# Patient Record
Sex: MALE | Race: WHITE | Employment: FULL TIME | URBAN - METROPOLITAN AREA
[De-identification: names, ages, dates, MRNs, and addresses within clinical notes are randomized per-mention and may not be internally consistent; named-entity substitution may affect disease eponyms.]

---

## 2021-11-08 DIAGNOSIS — M67.52 PLICA OF KNEE, LEFT: Primary | ICD-10-CM

## 2021-11-08 RX ORDER — OXYCODONE AND ACETAMINOPHEN 5; 325 MG/1; MG/1
1 TABLET ORAL
Qty: 42 TABLET | Refills: 0 | Status: SHIPPED | OUTPATIENT
Start: 2021-11-09 | End: 2021-11-16

## 2021-11-08 RX ORDER — CEPHALEXIN 500 MG/1
500 CAPSULE ORAL 4 TIMES DAILY
Qty: 28 CAPSULE | Refills: 0 | Status: SHIPPED | OUTPATIENT
Start: 2021-11-09 | End: 2021-11-16

## 2021-11-15 ENCOUNTER — TELEPHONE (OUTPATIENT)
Dept: ORTHOPEDIC SURGERY | Age: 26
End: 2021-11-15

## 2021-11-15 NOTE — TELEPHONE ENCOUNTER
Patient phoned ref. To pain and inflammation. Called patient back and advised ibuprofen, ice, elevation. Advised patient could stop narcotic and take tylenol if tolerable. Patient expressed understanding.

## 2021-11-19 ENCOUNTER — OFFICE VISIT (OUTPATIENT)
Dept: ORTHOPEDIC SURGERY | Age: 26
End: 2021-11-19
Payer: COMMERCIAL

## 2021-11-19 VITALS — HEIGHT: 72 IN | WEIGHT: 120 LBS | BODY MASS INDEX: 16.25 KG/M2

## 2021-11-19 VITALS — WEIGHT: 120 LBS | BODY MASS INDEX: 16.25 KG/M2 | HEIGHT: 72 IN

## 2021-11-19 DIAGNOSIS — M67.52 PLICA OF KNEE, LEFT: Primary | ICD-10-CM

## 2021-11-19 PROBLEM — S83.242A ACUTE MEDIAL MENISCUS TEAR OF LEFT KNEE: Status: RESOLVED | Noted: 2021-11-19 | Resolved: 2021-11-19

## 2021-11-19 PROBLEM — S83.242A ACUTE MEDIAL MENISCUS TEAR OF LEFT KNEE: Status: ACTIVE | Noted: 2021-11-19

## 2021-11-19 PROBLEM — M25.562 LEFT KNEE PAIN: Status: ACTIVE | Noted: 2021-11-19

## 2021-11-19 PROCEDURE — 99024 POSTOP FOLLOW-UP VISIT: CPT | Performed by: ORTHOPAEDIC SURGERY

## 2021-11-19 RX ORDER — HYDROMORPHONE HYDROCHLORIDE 2 MG/1
2 TABLET ORAL
Qty: 42 TABLET | Refills: 0 | Status: SHIPPED | OUTPATIENT
Start: 2021-11-19 | End: 2021-11-26

## 2021-11-19 RX ORDER — DICLOFENAC SODIUM 75 MG/1
TABLET, DELAYED RELEASE ORAL
COMMUNITY
Start: 2021-08-27 | End: 2021-11-19

## 2021-11-19 NOTE — PROGRESS NOTES
SUBJECTIVE/OBJECTIVE:  Tali Mora (: 1995) is a 27 y.o. male, patient,here for evaluation of the left knee. He is approximately a week status post left knee arthroscopy with plica excision. He reports has had increased swelling. Denies any fevers or chills. Is been taking Percocet. He denies any reinjury. Physical Exam    Examination of the operative knee reveals that there is a small effusion. The incisions are healing well, without evidence of drainage, erythema or warmth. There is limited range of motion secondary to discomfort. Strength distally is 5/5. There is no calf tenderness and a negative Cassi´s sign. Sensation is intact to light touch distally and there is a brisk capillary refill. ASSESSMENT/PLAN:  Below is the assessment and plan developed based on review of pertinent history, physical exam, labs, studies, and medications. 1. Plica of knee, left  -     HYDROmorphone (Dilaudid) 2 mg tablet; Take 1 Tablet by mouth every four to six (4-6) hours as needed for Pain for up to 7 days. Max Daily Amount: 12 mg., Normal, Disp-42 Tablet, R-0      Return in about 1 week (around 2021). After discussing treatment options, we have decided to proceed with formal physical therapy as well as a home exercise program for rehabilitation of the knee. We went over the arthroscopic pictures and removed the stitches during today´s visit. We will continue with ice and elevation of the knee to decrease swelling and pain. We will continue to utilize early mobilization and mechanical prophylaxis to reduce the chances of a deep vein thrombosis. We will wean them off any narcotic medications and progress to anti-inflammatories and Tylenol as long as there are no contraindications to these medications. We also discussed the risk and benefits and common side effects of taking these medications at today´s visit.  We also had a discussion regarding not driving while on narcotic medications and while impaired from a surgical or medical condition. I will see them back in three weeks time to evaluate their progress. They will call us in the interim if they have any questions or concerns prior to their follow up visit. After long discussion and performing a sterile preparation I anesthetized the knee with 10 cc of lidocaine. I then aspirated 30 cc of hematogenous fluid. The patient tolerated procedure well. We will follow him up in 1 week to make sure his swelling is improved and we can start physical therapy. No Known Allergies    Current Outpatient Medications   Medication Sig    HYDROmorphone (Dilaudid) 2 mg tablet Take 1 Tablet by mouth every four to six (4-6) hours as needed for Pain for up to 7 days. Max Daily Amount: 12 mg. No current facility-administered medications for this visit. Past Medical History:   Diagnosis Date    Acute medial meniscus tear of left knee 11/19/2021    Left knee pain 68/39/4270    Plica of knee, left 56/48/1195       Past Surgical History:   Procedure Laterality Date    HX KNEE ARTHROSCOPY         History reviewed. No pertinent family history.     Social History     Socioeconomic History    Marital status:      Spouse name: Not on file    Number of children: Not on file    Years of education: Not on file    Highest education level: Not on file   Occupational History    Not on file   Tobacco Use    Smoking status: Never Smoker    Smokeless tobacco: Never Used   Vaping Use    Vaping Use: Never used   Substance and Sexual Activity    Alcohol use: Not Currently    Drug use: Never    Sexual activity: Not on file   Other Topics Concern    Not on file   Social History Narrative    Not on file     Social Determinants of Health     Financial Resource Strain:     Difficulty of Paying Living Expenses: Not on file   Food Insecurity:     Worried About Running Out of Food in the Last Year: Not on file    [address] in the Last Year: Not on file   Transportation Needs:     Lack of Transportation (Medical): Not on file    Lack of Transportation (Non-Medical): Not on file   Physical Activity:     Days of Exercise per Week: Not on file    Minutes of Exercise per Session: Not on file   Stress:     Feeling of Stress : Not on file   Social Connections:     Frequency of Communication with Friends and Family: Not on file    Frequency of Social Gatherings with Friends and Family: Not on file    Attends Zoroastrianism Services: Not on file    Active Member of [address] [place] [address] [employer] or Organizations: Not on file    Attends Club or Organization Meetings: Not on file    Marital Status: Not on file   Intimate Partner Violence:     Fear of Current or Ex-Partner: Not on file    Emotionally Abused: Not on file    Physically Abused: Not on file    Sexually Abused: Not on file   Housing Stability:     Unable to Pay for Housing in the Last Year: Not on file    Number of [place] in the Last Year: Not on file    Unstable Housing in the Last Year: Not on file       Review of Systems    No flowsheet data found. Vitals:  Ht 6' (1.829 m)   Wt 120 lb (54.4 kg)   BMI 16.27 kg/m²    Body mass index is 16.27 kg/m². An electronic signature was used to authenticate this note.   -- Inder Mccarthy MD

## 2021-11-24 ENCOUNTER — OFFICE VISIT (OUTPATIENT)
Dept: ORTHOPEDIC SURGERY | Age: 26
End: 2021-11-24
Payer: COMMERCIAL

## 2021-11-24 VITALS — WEIGHT: 120 LBS | BODY MASS INDEX: 16.25 KG/M2 | HEIGHT: 72 IN

## 2021-11-24 DIAGNOSIS — S80.02XA HEMATOMA OF LEFT KNEE REGION: Primary | ICD-10-CM

## 2021-11-24 PROCEDURE — 99024 POSTOP FOLLOW-UP VISIT: CPT | Performed by: ORTHOPAEDIC SURGERY

## 2021-11-24 RX ORDER — CEPHALEXIN 500 MG/1
500 CAPSULE ORAL 4 TIMES DAILY
Qty: 30 CAPSULE | Refills: 0 | Status: SHIPPED | OUTPATIENT
Start: 2021-11-24 | End: 2022-01-18

## 2021-11-24 RX ORDER — OXYCODONE AND ACETAMINOPHEN 5; 325 MG/1; MG/1
1 TABLET ORAL
Qty: 42 TABLET | Refills: 0 | Status: SHIPPED | OUTPATIENT
Start: 2021-11-24 | End: 2021-12-01

## 2021-11-24 NOTE — PROGRESS NOTES
We discussed the treatment options for the patient´s diagnosis, which included: living with the extremity as it is, organized exercises, medicines, injections and surgical options. Utilizing shared treatment decision-making; we also discussed the nature and purpose of the treatment options along with the expected risks and benefits. The patient has expressed a desire to proceed with surgery, and I think that is a reasonable option. I educated the patient regarding the inherent and unavoidable risks which include, but are not limited to: anesthesia, infection, damage to nerves and blood vessels, blood loss, blood clots, and even death were discussed at length. We also talked about the possibility of not being able to return to prior activities or employment, the need for future surgery, and complex regional pain syndrome. The patient expressed understanding of these risks and has elected to proceed with surgery. Ample time was given for questions, of which many were addressed. We have discussed the surgical procedure as well as the realistic expectations regarding the risks, outcome and post operative protocol. We will set this up when it is convenient for the patient. We have instructed them to contact us if there are any questions or concerns between now and their date of surgery. We will schedule him for right knee arthroscopy with hematoma evacuation at this time. We will also refill his pain medication. SUBJECTIVE/OBJECTIVE:  Roman Murrell (: 1995) is a 27 y.o. male, patient,here for evaluation of the left knee. He is approximately a week status post left knee arthroscopy with plica excision. He reports has had increased swelling. Denies any fevers or chills. Is been taking Percocet. He denies any reinjury. Physical Exam    Examination of the operative knee reveals that there is a small effusion. The incisions are healing well, without evidence of drainage, erythema or warmth. There is limited range of motion secondary to discomfort. Strength distally is 5/5. There is no calf tenderness and a negative Cassi´s sign. Sensation is intact to light touch distally and there is a brisk capillary refill. ASSESSMENT/PLAN:  Below is the assessment and plan developed based on review of pertinent history, physical exam, labs, studies, and medications. 1. Plica of knee, left  -     HYDROmorphone (Dilaudid) 2 mg tablet; Take 1 Tablet by mouth every four to six (4-6) hours as needed for Pain for up to 7 days. Max Daily Amount: 12 mg., Normal, Disp-42 Tablet, R-0      Return in about 1 week (around 11/26/2021). After discussing treatment options, we have decided to proceed with formal physical therapy as well as a home exercise program for rehabilitation of the knee. We went over the arthroscopic pictures and removed the stitches during today´s visit. We will continue with ice and elevation of the knee to decrease swelling and pain. We will continue to utilize early mobilization and mechanical prophylaxis to reduce the chances of a deep vein thrombosis. We will wean them off any narcotic medications and progress to anti-inflammatories and Tylenol as long as there are no contraindications to these medications. We also discussed the risk and benefits and common side effects of taking these medications at today´s visit. We also had a discussion regarding not driving while on narcotic medications and while impaired from a surgical or medical condition. I will see them back in three weeks time to evaluate their progress. They will call us in the interim if they have any questions or concerns prior to their follow up visit. After long discussion and performing a sterile preparation I anesthetized the knee with 10 cc of lidocaine. I then aspirated 30 cc of hematogenous fluid. The patient tolerated procedure well.   We will follow him up in 1 week to make sure his swelling is improved and we can start physical therapy. No Known Allergies    Current Outpatient Medications   Medication Sig    HYDROmorphone (Dilaudid) 2 mg tablet Take 1 Tablet by mouth every four to six (4-6) hours as needed for Pain for up to 7 days. Max Daily Amount: 12 mg. No current facility-administered medications for this visit. Past Medical History:   Diagnosis Date    Acute medial meniscus tear of left knee 11/19/2021    Left knee pain 52/70/5607    Plica of knee, left 32/59/5748       Past Surgical History:   Procedure Laterality Date    HX KNEE ARTHROSCOPY         History reviewed. No pertinent family history. Social History     Socioeconomic History    Marital status:      Spouse name: Not on file    Number of children: Not on file    Years of education: Not on file    Highest education level: Not on file   Occupational History    Not on file   Tobacco Use    Smoking status: Never Smoker    Smokeless tobacco: Never Used   Vaping Use    Vaping Use: Never used   Substance and Sexual Activity    Alcohol use: Not Currently    Drug use: Never    Sexual activity: Not on file   Other Topics Concern    Not on file   Social History Narrative    Not on file     Social Determinants of Health     Financial Resource Strain:     Difficulty of Paying Living Expenses: Not on file   Food Insecurity:     Worried About Running Out of Food in the Last Year: Not on file    Herve of Food in the Last Year: Not on file   Transportation Needs:     Lack of Transportation (Medical): Not on file    Lack of Transportation (Non-Medical):  Not on file   Physical Activity:     Days of Exercise per Week: Not on file    Minutes of Exercise per Session: Not on file   Stress:     Feeling of Stress : Not on file   Social Connections:     Frequency of Communication with Friends and Family: Not on file    Frequency of Social Gatherings with Friends and Family: Not on file    Attends Confucianism Services: Not on file   [facility] Active Member of Clubs or Organizations: Not on file    Attends Club or Organization Meetings: Not on file    Marital Status: Not on file   Intimate Partner Violence:     Fear of Current or Ex-Partner: Not on file    Emotionally Abused: Not on file    Physically Abused: Not on file    Sexually Abused: Not on file   Housing Stability:     Unable to Pay for Housing in the Last Year: Not on file    Number of [place] in the Last Year: Not on file    Unstable Housing in the Last Year: Not on file       Review of Systems    No flowsheet data found. Vitals:  Ht 6' (1.829 m)   Wt 120 lb (54.4 kg)   BMI 16.27 kg/m²    Body mass index is 16.27 kg/m². An electronic signature was used to authenticate this note.   -- Gustavo Salazar MD

## 2021-11-29 ENCOUNTER — DOCUMENTATION ONLY (OUTPATIENT)
Dept: ORTHOPEDIC SURGERY | Age: 26
End: 2021-11-29

## 2021-11-29 NOTE — PROGRESS NOTES
Received notification from pharmacy that a prior authorization was needed for patients prescription for oxycodone. This was completed and submitted through [employer].      Savanna Cunha, III

## 2021-12-08 ENCOUNTER — OFFICE VISIT (OUTPATIENT)
Dept: ORTHOPEDIC SURGERY | Age: 26
End: 2021-12-08
Payer: COMMERCIAL

## 2021-12-08 VITALS — WEIGHT: 120 LBS | BODY MASS INDEX: 16.25 KG/M2 | HEIGHT: 72 IN

## 2021-12-08 DIAGNOSIS — S80.02XA HEMATOMA OF LEFT KNEE REGION: Primary | ICD-10-CM

## 2021-12-08 PROCEDURE — 99024 POSTOP FOLLOW-UP VISIT: CPT | Performed by: ORTHOPAEDIC SURGERY

## 2021-12-08 NOTE — PROGRESS NOTES
ASSESSMENT/PLAN:  Below is the assessment and plan developed based on review of pertinent history, physical exam, labs, studies, and medications. 1. Hematoma of left knee region  After discussing risk and benefits, I anesthetized the skin and aspirated approximately 40 cc of hematoma from the knee. He tolerated the procedure well. A Band-Aid was placed. Given that the patient's symptoms are increasing in frequency and duration we have decided to prescribe physical therapy. We talked about the fact that the goal of physical therapy is for the therapsit to assist in developing a program to help return the patient to full strength, function and mobility and decrease pain. We also discussed that the therpasit may combine several techniques to help decrease pain. These include but are not limited to stretching,  balance exercises, strength training, massage, cold and heat therapy, and electrical stimulation. Althoough, physical therpay is generally safe, we went over the potential risks to include the worsening of pre-existing conditions, continued pain and no improvement in flexibility, mobility and strength. We will have the patient follow up after physical therapy to closely monitor their progress. We talked about following up sooner if therapy is not progressing on a weekly basis. No follow-ups on file. After discussing treatment options, we have decided to proceed with formal physical therapy as well as a home exercise program for rehabilitation of the knee. We went over the arthroscopic pictures and removed the stitches during today´s visit. We will continue with ice and elevation of the knee to decrease swelling and pain. We will continue to utilize early mobilization and mechanical prophylaxis to reduce the chances of a deep vein thrombosis.  We will wean them off any narcotic medications and progress to anti-inflammatories and Tylenol as long as there are no contraindications to these medications. We also discussed the risk and benefits and common side effects of taking these medications at today´s visit. We also had a discussion regarding not driving while on narcotic medications and while impaired from a surgical or medical condition. I will see them back in three weeks time to evaluate their progress. They will call us in the interim if they have any questions or concerns prior to their follow up visit. SUBJECTIVE/OBJECTIVE:  Reed Coy (: 1995) is a 27 y.o. male, patient,here for evaluation of the No chief complaint on file. .  The patient returns today for follow-up of his left knee. He is approximately 1 week status post left knee arthroscopy with hematoma evacuation. Has been icing elevating. He denies any numbness or tingling erythema or warmth. He is continued ice and elevate as well as take some anti-inflammatory medications. Physical Exam    Examination of the operative knee reveals that there is a small effusion. The incisions are healing well, without evidence of drainage, erythema or warmth. There is limited range of motion secondary to discomfort. Strength distally is 5/5. There is no calf tenderness and a negative Cassi´s sign. Sensation is intact to light touch distally and there is a brisk capillary refill. No Known Allergies    Current Outpatient Medications   Medication Sig    cephALEXin (Keflex) 500 mg capsule Take 1 Capsule by mouth four (4) times daily. No current facility-administered medications for this visit. Past Medical History:   Diagnosis Date    Acute medial meniscus tear of left knee 2021    Left knee pain     Plica of knee, left        Past Surgical History:   Procedure Laterality Date    HX KNEE ARTHROSCOPY         No family history on file.     Social History     Socioeconomic History    Marital status:      Spouse name: Not on file    Number of children: Not on file    Years of education: Not on file    Highest education level: Not on file   Occupational History    Not on file   Tobacco Use    Smoking status: Never Smoker    Smokeless tobacco: Never Used   Vaping Use    Vaping Use: Never used   Substance and Sexual Activity    Alcohol use: Not Currently    Drug use: Never    Sexual activity: Not on file   Other Topics Concern    Not on file   Social History Narrative    Not on file     Social Determinants of Health     Financial Resource Strain:     Difficulty of Paying Living Expenses: Not on file   Food Insecurity:     Worried About Running Out of Food in the Last Year: Not on file    Herve of Food in the Last Year: Not on file   Transportation Needs:     Lack of Transportation (Medical): Not on file    Lack of Transportation (Non-Medical): Not on file   Physical Activity:     Days of Exercise per Week: Not on file    Minutes of Exercise per Session: Not on file   Stress:     Feeling of Stress : Not on file   Social Connections:     Frequency of Communication with Friends and Family: Not on file    Frequency of Social Gatherings with Friends and Family: Not on file    Attends [employer] Services: Not on file    Active Member of [address] [place] [address] or Organizations: Not on file    Attends Club or Organization Meetings: Not on file    Marital Status: Not on file   Intimate Partner Violence:     Fear of Current or Ex-Partner: Not on file    Emotionally Abused: Not on file    Physically Abused: Not on file    Sexually Abused: Not on file   Housing Stability:     Unable to Pay for Housing in the Last Year: Not on file    Number of [place] in the Last Year: Not on file    Unstable Housing in the Last Year: Not on file       Review of Systems    No flowsheet data found. Vitals: There were no vitals taken for this visit. There is no height or weight on file to calculate BMI. An electronic signature was used to authenticate this note.   -- Shanelle Zuleta MD

## 2021-12-27 PROBLEM — S80.02XA HEMATOMA OF LEFT KNEE REGION: Status: ACTIVE | Noted: 2021-12-27

## 2021-12-27 NOTE — PROGRESS NOTES
ASSESSMENT/PLAN:  Below is the assessment and plan developed based on review of pertinent history, physical exam, labs, studies, and medications. 1. Hematoma of left knee region      Return in about 3 weeks (around 2022). Given the fact these continue make progress with physical therapy will continue therapy 3 times a week. I urged him to work more diligently in home to regain his range of motion as well as some soft tissue massage. We talked about the possibly of manipulation under anesthesia if he continues to struggle with his motion. SUBJECTIVE/OBJECTIVE:  Felecia Benavidez (: 1995) is a 27 y.o. male, patient,here for evaluation of the Knee Pain (left knee) and Surgical Follow-up  . Patient returns today for follow-up of his left knee. He is proximately a month status post irrigation and debridement from left knee hematoma. He has been dissipating in physical therapy. He reports he still has some weakness and swelling but is making progress. Physical Exam    Examination of the operative knee reveals that there is a small effusion. The incisions are well healed, without evidence of drainage, erythema or warmth. Range of motion and strength are progressing appropriately at this stage of rehabilitation. Strength distally is 5/5. There is no calf tenderness and a negative Cassi´s sign. Sensation is intact to light touch distally and there is a brisk capillary refill. No Known Allergies    Current Outpatient Medications   Medication Sig    cephALEXin (Keflex) 500 mg capsule Take 1 Capsule by mouth four (4) times daily. No current facility-administered medications for this visit. Past Medical History:   Diagnosis Date    Acute medial meniscus tear of left knee 2021    Left knee pain     Plica of knee, left        Past Surgical History:   Procedure Laterality Date    HX KNEE ARTHROSCOPY         History reviewed.  No pertinent family history. Social History     Socioeconomic History    Marital status:      Spouse name: Not on file    Number of children: Not on file    Years of education: Not on file    Highest education level: Not on file   Occupational History    Not on file   Tobacco Use    Smoking status: Never Smoker    Smokeless tobacco: Never Used   Vaping Use    Vaping Use: Never used   Substance and Sexual Activity    Alcohol use: Not Currently    Drug use: Never    Sexual activity: Not on file   Other Topics Concern    Not on file   Social History Narrative    Not on file     Social Determinants of Health     Financial Resource Strain:     Difficulty of Paying Living Expenses: Not on file   Food Insecurity:     Worried About Running Out of Food in the Last Year: Not on file    Herve of Food in the Last Year: Not on file   Transportation Needs:     Lack of Transportation (Medical): Not on file    Lack of Transportation (Non-Medical):  Not on file   Physical Activity:     Days of Exercise per Week: Not on file    Minutes of Exercise per Session: Not on file   Stress:     Feeling of Stress : Not on file   Social Connections:     Frequency of Communication with Friends and Family: Not on file    Frequency of Social Gatherings with Friends and Family: Not on file    Attends Synagogue Services: Not on file    Active Member of [address] [place] [address] [employer] or Organizations: Not on file    Attends Club or Organization Meetings: Not on file    Marital Status: Not on file   Intimate Partner Violence:     Fear of Current or Ex-Partner: Not on file    Emotionally Abused: Not on file    Physically Abused: Not on file    Sexually Abused: Not on file   Housing Stability:     Unable to Pay for Housing in the Last Year: Not on file    Number of [place] in the Last Year: Not on file    Unstable Housing in the Last Year: Not on file       Review of Systems    Pain Assessment  12/8/2021   Location of Pain Knee   Location Modifiers Left   Severity of Pain 4       Vitals:  Ht 6' (1.829 m)   Wt 120 lb (54.4 kg)   BMI 16.27 kg/m²    Body mass index is 16.27 kg/m². An electronic signature was used to authenticate this note.   -- Gustavo Salazar MD

## 2021-12-28 ENCOUNTER — OFFICE VISIT (OUTPATIENT)
Dept: ORTHOPEDIC SURGERY | Age: 26
End: 2021-12-28
Payer: COMMERCIAL

## 2021-12-28 VITALS — BODY MASS INDEX: 16.25 KG/M2 | WEIGHT: 120 LBS | HEIGHT: 72 IN

## 2021-12-28 DIAGNOSIS — S80.02XA HEMATOMA OF LEFT KNEE REGION: Primary | ICD-10-CM

## 2021-12-28 PROCEDURE — 99024 POSTOP FOLLOW-UP VISIT: CPT | Performed by: ORTHOPAEDIC SURGERY

## 2022-01-18 ENCOUNTER — OFFICE VISIT (OUTPATIENT)
Dept: ORTHOPEDIC SURGERY | Age: 27
End: 2022-01-18
Payer: COMMERCIAL

## 2022-01-18 VITALS — HEIGHT: 72 IN | WEIGHT: 120 LBS | BODY MASS INDEX: 16.25 KG/M2

## 2022-01-18 DIAGNOSIS — S80.02XA HEMATOMA OF LEFT KNEE REGION: Primary | ICD-10-CM

## 2022-01-18 DIAGNOSIS — M24.662 ARTHROFIBROSIS OF KNEE JOINT, LEFT: ICD-10-CM

## 2022-01-18 DIAGNOSIS — M24.562 FLEXION CONTRACTURE OF KNEE, LEFT: ICD-10-CM

## 2022-01-18 PROCEDURE — 99024 POSTOP FOLLOW-UP VISIT: CPT | Performed by: ORTHOPAEDIC SURGERY

## 2022-01-18 NOTE — PROGRESS NOTES
ASSESSMENT/PLAN:  Below is the assessment and plan developed based on review of pertinent history, physical exam, labs, studies, and medications. 1. Hematoma of left knee region      We discussed the treatment options for the patient´s diagnosis, which included: living with the extremity as it is, organized exercises, medicines, injections, and surgical options. Utilizing shared treatment decision-making; we also discussed the nature and purpose of the treatment options along with the expected risks and benefits. The patient has expressed a desire to proceed with surgery, and I think that is a reasonable option. I educated the patient regarding the inherent and unavoidable risks which include, but are not limited to anesthesia, infection, damage to nerves and blood vessels, blood loss, blood clots, and even death were discussed at length. We also talked about the possibility of not being able to return to prior activities or employment, the need for future surgery, and complex regional pain syndrome. The patient expressed understanding of these risks and has elected to proceed with surgery. Ample time was given for questions, of which many were addressed. We have discussed the surgical procedure as well as the realistic expectations regarding the risks, outcome and post-operative protocol. We will set this up when it is convenient for the patient. We have instructed them to contact us if there are any questions or concerns between now and their date of surgery. We talked about procedure as well as postoperative protocol in detail. He is can look at his calendar and call us to schedule left knee manipulation managed anesthesia with cortisone injection followed by daily physical therapy. SUBJECTIVE/OBJECTIVE:  Eric Ricks (: 1995) is a 27 y.o. male, patient,here for evaluation of the Knee Pain (left knee) and Surgical Follow-up  . Patient returns today for follow-up of his left knee.   He is proximately a month status post irrigation and debridement from left knee hematoma. He has been dissipating in physical therapy. He reports he still has some weakness and swelling but is making progress. He has been making slow progress. Physical Exam    Examination of the operative knee reveals that there is a small effusion. The incisions are well healed, without evidence of drainage, erythema or warmth. Range of motion and strength are progressing appropriately at this stage of rehabilitation. Strength distally is 5/5. There is no calf tenderness and a negative Cassi´s sign. Sensation is intact to light touch distally and there is a brisk capillary refill. He still lacks approximately 5 degrees of extension as well as 40 degrees of flexion. He has some quadricep weakness. No Known Allergies    Current Outpatient Medications   Medication Sig    cephALEXin (Keflex) 500 mg capsule Take 1 Capsule by mouth four (4) times daily. No current facility-administered medications for this visit. Past Medical History:   Diagnosis Date    Acute medial meniscus tear of left knee 11/19/2021    Left knee pain 41/64/6461    Plica of knee, left 03/34/4873       Past Surgical History:   Procedure Laterality Date    HX KNEE ARTHROSCOPY         History reviewed. No pertinent family history.     Social History     Socioeconomic History    Marital status:      Spouse name: Not on file    Number of children: Not on file    Years of education: Not on file    Highest education level: Not on file   Occupational History    Not on file   Tobacco Use    Smoking status: Never Smoker    Smokeless tobacco: Never Used   Vaping Use    Vaping Use: Never used   Substance and Sexual Activity    Alcohol use: Not Currently    Drug use: Never    Sexual activity: Not on file   Other Topics Concern    Not on file   Social History Narrative    Not on file     Social Determinants of Health     Financial Resource Strain:     Difficulty of Paying Living Expenses: Not on file   Food Insecurity:     Worried About Running Out of Food in the Last Year: Not on file    Herve of Food in the Last Year: Not on file   Transportation Needs:     Lack of Transportation (Medical): Not on file    Lack of Transportation (Non-Medical): Not on file   Physical Activity:     Days of Exercise per Week: Not on file    Minutes of Exercise per Session: Not on file   Stress:     Feeling of Stress : Not on file   Social Connections:     Frequency of Communication with Friends and Family: Not on file    Frequency of Social Gatherings with Friends and Family: Not on file    Attends Latter-day Services: Not on file    Active Member of [address] [place] [address] [employer] or Organizations: Not on file    Attends Club or Organization Meetings: Not on file    Marital Status: Not on file   Intimate Partner Violence:     Fear of Current or Ex-Partner: Not on file    Emotionally Abused: Not on file    Physically Abused: Not on file    Sexually Abused: Not on file   Housing Stability:     Unable to Pay for Housing in the Last Year: Not on file    Number of [place] in the Last Year: Not on file    Unstable Housing in the Last Year: Not on file       Review of Systems    Pain Assessment  12/8/2021   Location of Pain Knee   Location Modifiers Left   Severity of Pain 4       Vitals:  Ht 6' (1.829 m)   Wt 120 lb (54.4 kg)   BMI 16.27 kg/m²    Body mass index is 16.27 kg/m². An electronic signature was used to authenticate this note.   -- Amalia Hernandez MD

## 2022-01-20 DIAGNOSIS — M24.562 FLEXION CONTRACTURE OF KNEE, LEFT: Primary | ICD-10-CM

## 2022-01-21 RX ORDER — OXYCODONE AND ACETAMINOPHEN 5; 325 MG/1; MG/1
1 TABLET ORAL
Qty: 42 TABLET | Refills: 0 | Status: SHIPPED | OUTPATIENT
Start: 2022-01-21 | End: 2022-01-28

## 2022-01-25 ENCOUNTER — TELEPHONE (OUTPATIENT)
Dept: ORTHOPEDIC SURGERY | Age: 27
End: 2022-01-25

## 2022-01-25 DIAGNOSIS — M24.562 FLEXION CONTRACTURE OF KNEE, LEFT: Primary | ICD-10-CM

## 2022-01-25 RX ORDER — ONDANSETRON 4 MG/1
4 TABLET, FILM COATED ORAL
Qty: 30 TABLET | Refills: 0 | Status: SHIPPED | OUTPATIENT
Start: 2022-01-25 | End: 2022-02-23

## 2022-01-25 NOTE — TELEPHONE ENCOUNTER
Patient phones office stating that he has nausea and vomitting after taking his pain med. I advised patient that he could stop the narcotic pain med and take ibuprofen and tylenol alternating, patient understood. I will send in zofran for if he has to take the narcotic pain med.

## 2022-01-30 NOTE — PROGRESS NOTES
ASSESSMENT/PLAN:  Below is the assessment and plan developed based on review of pertinent history, physical exam, labs, studies, and medications. 1. Arthrofibrosis of knee joint, left      Return in about 3 weeks (around 2022). He continues to make progress with physical therapy after the manipulation. Is pain levels are well controlled. We have encouraged him to continue to work diligently on his range of motion with extension and also flexion. He is going to therapy on a daily basis and doing the home exercises pretty much every hour. We will see him back in a few weeks time for reevaluation. SUBJECTIVE/OBJECTIVE:  Yamil Gaines (: 1995) is a 27 y.o. male, patient,here for evaluation of the Knee Pain (left knee) and Surgical Follow-up  . The patient returns today for follow-up of his left knee. He underwent left knee manipulation under anesthesia with cortisone injection on 2022. He has been attending physical therapy daily. He has been working on range of motion exercises. He denies any numbness tingling erythema or warmth. Physical Exam    General: Well-dressed well-nourished male no acute distress, alert and oriented x3  Left knee: Incisions healed nicely over the anterior portion of the knee. Slight effusion on exam.  Motion from 2 - 125 degrees. No swelling or tenderness in the calf. Toes up and downgoing. Sensation intact to light touch over the knee and distally in the leg and foot. No Known Allergies    Current Outpatient Medications   Medication Sig    ondansetron hcl (Zofran) 4 mg tablet Take 1 Tablet by mouth every eight (8) hours as needed for Nausea or Vomiting. No current facility-administered medications for this visit.        Past Medical History:   Diagnosis Date    Acute medial meniscus tear of left knee 2021    Left knee pain     Plica of knee, left        Past Surgical History:   Procedure Laterality Date  HX KNEE ARTHROSCOPY         History reviewed. No pertinent family history. Social History     Socioeconomic History    Marital status:      Spouse name: Not on file    Number of children: Not on file    Years of education: Not on file    Highest education level: Not on file   Occupational History    Not on file   Tobacco Use    Smoking status: Never Smoker    Smokeless tobacco: Never Used   Vaping Use    Vaping Use: Never used   Substance and Sexual Activity    Alcohol use: Not Currently    Drug use: Never    Sexual activity: Not on file   Other Topics Concern    Not on file   Social History Narrative    Not on file     Social Determinants of Health     Financial Resource Strain:     Difficulty of Paying Living Expenses: Not on file   Food Insecurity:     Worried About Running Out of Food in the Last Year: Not on file    Herve of Food in the Last Year: Not on file   Transportation Needs:     Lack of Transportation (Medical): Not on file    Lack of Transportation (Non-Medical):  Not on file   Physical Activity:     Days of Exercise per Week: Not on file    Minutes of Exercise per Session: Not on file   Stress:     Feeling of Stress : Not on file   Social Connections:     Frequency of Communication with Friends and Family: Not on file    Frequency of Social Gatherings with Friends and Family: Not on file    Attends Hoahaoism Services: Not on file    Active Member of [address] [place] [address] [employer] or Organizations: Not on file    Attends Club or Organization Meetings: Not on file    Marital Status: Not on file   Intimate Partner Violence:     Fear of Current or Ex-Partner: Not on file    Emotionally Abused: Not on file    Physically Abused: Not on file    Sexually Abused: Not on file   Housing Stability:     Unable to Pay for Housing in the Last Year: Not on file    Number of [place] in the Last Year: Not on file    Unstable Housing in the Last Year: Not on file       Review of Systems    Pain Assessment  12/8/2021   Location of Pain Knee   Location Modifiers Left   Severity of Pain 4       Vitals:  Ht 6' (1.829 m)   Wt 120 lb (54.4 kg)   BMI 16.27 kg/m²    Body mass index is 16.27 kg/m². An electronic signature was used to authenticate this note.   -- Aneta Jimenez MD

## 2022-01-31 ENCOUNTER — OFFICE VISIT (OUTPATIENT)
Dept: ORTHOPEDIC SURGERY | Age: 27
End: 2022-01-31
Payer: COMMERCIAL

## 2022-01-31 VITALS — BODY MASS INDEX: 16.25 KG/M2 | HEIGHT: 72 IN | WEIGHT: 120 LBS

## 2022-01-31 DIAGNOSIS — M24.662 ARTHROFIBROSIS OF KNEE JOINT, LEFT: Primary | ICD-10-CM

## 2022-01-31 PROCEDURE — 99024 POSTOP FOLLOW-UP VISIT: CPT | Performed by: ORTHOPAEDIC SURGERY

## 2022-01-31 NOTE — LETTER
NOTIFICATION RETURN TO WORK / SCHOOL    1/31/2022 9:53 AM    Mr. Duke Zamorano  [address] [facility] [address]      To Whom It May Concern:    Duke Zamorano is currently under the care of Adonay Pan. He will return to work/school on: January 31 of 2022. He is required to do 10 minutes of stretching for every hour that he is working. If there are questions or concerns please have the patient contact our office.         Sincerely,      Florencia Rodriguez MD

## 2022-02-22 NOTE — PROGRESS NOTES
ASSESSMENT/PLAN:  Below is the assessment and plan developed based on review of pertinent history, physical exam, labs, studies, and medications. 1. Arthrofibrosis of knee joint, left      We will continue physical therapy primarily focusing on his extension. I will see him back in 1 month's time to evaluate his progress. SUBJECTIVE/OBJECTIVE:  Fatou Jacques (: 1995) is a 27 y.o. male, patient,here for evaluation of the Knee Pain (left knee) and Surgical Follow-up  . The patient returns today for follow-up of his left knee. He underwent left knee manipulation under anesthesia with cortisone injection on 2022. He has been attending physical therapy daily. He has been working on range of motion exercises. He denies any numbness tingling erythema or warmth. He has gained almost full range of motion at this time and he feels good about riding a bike. Physical Exam    General: Well-dressed well-nourished male no acute distress, alert and oriented x3  Left knee: Incisions healed nicely over the anterior portion of the knee. Slight effusion on exam.  Motion from 2 - 125 degrees. No swelling or tenderness in the calf. Toes up and downgoing. Sensation intact to light touch over the knee and distally in the leg and foot. No Known Allergies    Current Outpatient Medications   Medication Sig    ondansetron hcl (Zofran) 4 mg tablet Take 1 Tablet by mouth every eight (8) hours as needed for Nausea or Vomiting. No current facility-administered medications for this visit. Past Medical History:   Diagnosis Date    Acute medial meniscus tear of left knee 2021    Left knee pain     Plica of knee, left        Past Surgical History:   Procedure Laterality Date    HX KNEE ARTHROSCOPY         History reviewed. No pertinent family history.     Social History     Socioeconomic History    Marital status:      Spouse name: Not on file    Number of children: Not on file    Years of education: Not on file    Highest education level: Not on file   Occupational History    Not on file   Tobacco Use    Smoking status: Never Smoker    Smokeless tobacco: Never Used   Vaping Use    Vaping Use: Never used   Substance and Sexual Activity    Alcohol use: Not Currently    Drug use: Never    Sexual activity: Not on file   Other Topics Concern    Not on file   Social History Narrative    Not on file     Social Determinants of Health     Financial Resource Strain:     Difficulty of Paying Living Expenses: Not on file   Food Insecurity:     Worried About Running Out of Food in the Last Year: Not on file    Herve of Food in the Last Year: Not on file   Transportation Needs:     Lack of Transportation (Medical): Not on file    Lack of Transportation (Non-Medical):  Not on file   Physical Activity:     Days of Exercise per Week: Not on file    Minutes of Exercise per Session: Not on file   Stress:     Feeling of Stress : Not on file   Social Connections:     Frequency of Communication with Friends and Family: Not on file    Frequency of Social Gatherings with Friends and Family: Not on file    Attends Mormonism Services: Not on file    Active Member of [address] [place] [address] [employer] or Organizations: Not on file    Attends Club or Organization Meetings: Not on file    Marital Status: Not on file   Intimate Partner Violence:     Fear of Current or Ex-Partner: Not on file    Emotionally Abused: Not on file    Physically Abused: Not on file    Sexually Abused: Not on file   Housing Stability:     Unable to Pay for Housing in the Last Year: Not on file    Number of [place] in the Last Year: Not on file    Unstable Housing in the Last Year: Not on file       Review of Systems    Pain Assessment  12/8/2021   Location of Pain Knee   Location Modifiers Left   Severity of Pain 4       Vitals:  Ht 6' (1.829 m)   Wt 120 lb (54.4 kg)   BMI 16.27 kg/m²    Body mass index is 16.27 kg/m². An electronic signature was used to authenticate this note.   -- Alyx Armendariz MD

## 2022-02-23 ENCOUNTER — OFFICE VISIT (OUTPATIENT)
Dept: ORTHOPEDIC SURGERY | Age: 27
End: 2022-02-23
Payer: COMMERCIAL

## 2022-02-23 VITALS — HEIGHT: 72 IN | WEIGHT: 120 LBS | BODY MASS INDEX: 16.25 KG/M2

## 2022-02-23 DIAGNOSIS — M24.662 ARTHROFIBROSIS OF KNEE JOINT, LEFT: Primary | ICD-10-CM

## 2022-02-23 PROCEDURE — 99024 POSTOP FOLLOW-UP VISIT: CPT | Performed by: ORTHOPAEDIC SURGERY

## 2022-03-24 ENCOUNTER — DOCUMENTATION ONLY (OUTPATIENT)
Dept: ORTHOPEDIC SURGERY | Age: 27
End: 2022-03-24

## 2022-03-30 ENCOUNTER — OFFICE VISIT (OUTPATIENT)
Dept: ORTHOPEDIC SURGERY | Age: 27
End: 2022-03-30
Payer: COMMERCIAL

## 2022-03-30 VITALS — HEIGHT: 72 IN | WEIGHT: 120 LBS | BODY MASS INDEX: 16.25 KG/M2

## 2022-03-30 DIAGNOSIS — M24.662 ARTHROFIBROSIS OF KNEE JOINT, LEFT: Primary | ICD-10-CM

## 2022-03-30 PROCEDURE — 99213 OFFICE O/P EST LOW 20 MIN: CPT | Performed by: ORTHOPAEDIC SURGERY

## 2022-03-30 NOTE — PROGRESS NOTES
ASSESSMENT/PLAN:  Below is the assessment and plan developed based on review of pertinent history, physical exam, labs, studies, and medications. 1. Arthrofibrosis of knee joint, left      We will continue physical therapy primarily focusing on his extension. I will see him back in 1 month's time to evaluate his progress. SUBJECTIVE/OBJECTIVE:  John Chan (: 1995) is a 27 y.o. male, patient,here for evaluation of the Knee Pain (left knee) and Surgical Follow-up  . The patient returns today for follow-up of his left knee. He underwent left knee manipulation under anesthesia with cortisone injection on 2022. He has been attending physical therapy daily. He has been working on range of motion exercises. He denies any numbness tingling erythema or warmth. He has gained almost full range of motion at this time and he feels good about riding a bike. Physical Exam    General: Well-dressed well-nourished male no acute distress, alert and oriented x3  Left knee: Incisions healed nicely over the anterior portion of the knee. Slight effusion on exam.  Motion from 2 - 125 degrees. No swelling or tenderness in the calf. Toes up and downgoing. Sensation intact to light touch over the knee and distally in the leg and foot. No Known Allergies    Current Outpatient Medications   Medication Sig    ondansetron hcl (Zofran) 4 mg tablet Take 1 Tablet by mouth every eight (8) hours as needed for Nausea or Vomiting. No current facility-administered medications for this visit. Past Medical History:   Diagnosis Date    Acute medial meniscus tear of left knee 2021    Left knee pain     Plica of knee, left        Past Surgical History:   Procedure Laterality Date    HX KNEE ARTHROSCOPY         History reviewed. No pertinent family history.     Social History     Socioeconomic History    Marital status:      Spouse name: Not on file    Number of children: Not on file    Years of education: Not on file    Highest education level: Not on file   Occupational History    Not on file   Tobacco Use    Smoking status: Never Smoker    Smokeless tobacco: Never Used   Vaping Use    Vaping Use: Never used   Substance and Sexual Activity    Alcohol use: Not Currently    Drug use: Never    Sexual activity: Not on file   Other Topics Concern    Not on file   Social History Narrative    Not on file     Social Determinants of Health     Financial Resource Strain:     Difficulty of Paying Living Expenses: Not on file   Food Insecurity:     Worried About Running Out of Food in the Last Year: Not on file    Herve of Food in the Last Year: Not on file   Transportation Needs:     Lack of Transportation (Medical): Not on file    Lack of Transportation (Non-Medical):  Not on file   Physical Activity:     Days of Exercise per Week: Not on file    Minutes of Exercise per Session: Not on file   Stress:     Feeling of Stress : Not on file   Social Connections:     Frequency of Communication with Friends and Family: Not on file    Frequency of Social Gatherings with Friends and Family: Not on file    Attends Roman Catholic Services: Not on file    Active Member of [address] [place] [address] [employer] or Organizations: Not on file    Attends Club or Organization Meetings: Not on file    Marital Status: Not on file   Intimate Partner Violence:     Fear of Current or Ex-Partner: Not on file    Emotionally Abused: Not on file    Physically Abused: Not on file    Sexually Abused: Not on file   Housing Stability:     Unable to Pay for Housing in the Last Year: Not on file    Number of [place] in the Last Year: Not on file    Unstable Housing in the Last Year: Not on file       Review of Systems    Pain Assessment  12/8/2021   Location of Pain Knee   Location Modifiers Left   Severity of Pain 4       Vitals:  Ht 6' (1.829 m)   Wt 120 lb (54.4 kg)   BMI 16.27 kg/m²    Body mass index is 16.27 kg/m². An electronic signature was used to authenticate this note.   -- Leeroy Closs, MD

## 2022-05-25 ENCOUNTER — OFFICE VISIT (OUTPATIENT)
Dept: ORTHOPEDIC SURGERY | Age: 27
End: 2022-05-25
Payer: COMMERCIAL

## 2022-05-25 VITALS — BODY MASS INDEX: 16.25 KG/M2 | HEIGHT: 72 IN | WEIGHT: 120 LBS

## 2022-05-25 DIAGNOSIS — M24.662 ARTHROFIBROSIS OF KNEE JOINT, LEFT: Primary | ICD-10-CM

## 2022-05-25 PROCEDURE — 99213 OFFICE O/P EST LOW 20 MIN: CPT | Performed by: PHYSICIAN ASSISTANT

## 2022-05-25 NOTE — PROGRESS NOTES
ASSESSMENT/PLAN:  Below is the assessment and plan developed based on review of pertinent history, physical exam, labs, studies, and medications. 1. Arthrofibrosis of knee joint, left    28-year-old male following up after his left knee surgery in January. He has regained full range of motion at this time and is continuing to gain his strength back. He does still have significant quad atrophy but he is back doing most every day activities that he would like to be doing. He will continue working with physical therapy and doing a home exercise program in order to regain his full strength. He will follow-up with us on an as-needed basis at this point. SUBJECTIVE/OBJECTIVE:  Freya Greene (: 1995) is a 27 y.o. male, patient,here for evaluation of the Knee Pain (left knee) and Surgical Follow-up  . The patient returns today for follow-up of his left knee. He underwent left knee manipulation under anesthesia with cortisone injection on 2022. He has been attending physical therapy daily. He feels as though he continues to progress. He has mainly been working on strengthening. He denies any numbness tingling erythema or warmth. He has gained almost full range of motion at this time and he feels good about riding a bike. Physical Exam    General: Well-dressed well-nourished male no acute distress, alert and oriented x3  Left knee: Incisions healed nicely over the anterior portion of the knee. Slight effusion on exam.  Motion from 0 - 135 degrees. No swelling or tenderness in the calf. Toes up and downgoing. Sensation intact to light touch over the knee and distally in the leg and foot. There is still significant quad weakness. No Known Allergies    Current Outpatient Medications   Medication Sig    ondansetron hcl (Zofran) 4 mg tablet Take 1 Tablet by mouth every eight (8) hours as needed for Nausea or Vomiting. No current facility-administered medications for this visit. Past Medical History:   Diagnosis Date    Acute medial meniscus tear of left knee 11/19/2021    Left knee pain 78/35/0566    Plica of knee, left 73/05/0885       Past Surgical History:   Procedure Laterality Date    HX KNEE ARTHROSCOPY         History reviewed. No pertinent family history. Social History     Socioeconomic History    Marital status:      Spouse name: Not on file    Number of children: Not on file    Years of education: Not on file    Highest education level: Not on file   Occupational History    Not on file   Tobacco Use    Smoking status: Never Smoker    Smokeless tobacco: Never Used   Vaping Use    Vaping Use: Never used   Substance and Sexual Activity    Alcohol use: Not Currently    Drug use: Never    Sexual activity: Not on file   Other Topics Concern    Not on file   Social History Narrative    Not on file     Social Determinants of Health     Financial Resource Strain:     Difficulty of Paying Living Expenses: Not on file   Food Insecurity:     Worried About Running Out of Food in the Last Year: Not on file    Herve of Food in the Last Year: Not on file   Transportation Needs:     Lack of Transportation (Medical): Not on file    Lack of Transportation (Non-Medical):  Not on file   Physical Activity:     Days of Exercise per Week: Not on file    Minutes of Exercise per Session: Not on file   Stress:     Feeling of Stress : Not on file   Social Connections:     Frequency of Communication with Friends and Family: Not on file    Frequency of Social Gatherings with Friends and Family: Not on file    Attends Taoist Services: Not on file    Active Member of Clubs or Organizations: Not on file    Attends Club or Organization Meetings: Not on file    Marital Status: Not on file   Intimate Partner Violence:     Fear of Current or Ex-Partner: Not on file    Emotionally Abused: Not on file    Physically Abused: Not on file    Sexually Abused: Not on file   Housing Stability:     Unable to Pay for Housing in the Last Year: Not on file    Number of Places Lived in the Last Year: Not on file    Unstable Housing in the Last Year: Not on file       Review of Systems    Pain Assessment  12/8/2021   Location of Pain Knee   Location Modifiers Left   Severity of Pain 4       Vitals:  Ht 6' (1.829 m)   Wt 120 lb (54.4 kg)   BMI 16.27 kg/m²    Body mass index is 16.27 kg/m². An electronic signature was used to authenticate this note.   -- Agusto Foy, PA-C